# Patient Record
Sex: MALE | URBAN - METROPOLITAN AREA
[De-identification: names, ages, dates, MRNs, and addresses within clinical notes are randomized per-mention and may not be internally consistent; named-entity substitution may affect disease eponyms.]

---

## 2024-06-07 ENCOUNTER — EMERGENCY (EMERGENCY)
Facility: HOSPITAL | Age: 29
LOS: 1 days | Discharge: LEFT BEFORE DISCHARGE | End: 2024-06-07
Attending: EMERGENCY MEDICINE | Admitting: EMERGENCY MEDICINE

## 2024-06-07 VITALS
RESPIRATION RATE: 16 BRPM | OXYGEN SATURATION: 97 % | WEIGHT: 184.97 LBS | TEMPERATURE: 98 F | HEART RATE: 98 BPM | DIASTOLIC BLOOD PRESSURE: 75 MMHG | HEIGHT: 73 IN | SYSTOLIC BLOOD PRESSURE: 118 MMHG

## 2024-06-07 PROCEDURE — L9991: CPT

## 2024-06-08 NOTE — ED ADULT NURSE NOTE - BIRTH SEX
Refill completed. Thank you.   TORI Acevedo.    
Was the patient seen in the last year in this department? Yes     Does patient have an active prescription for medications requested? No     Received Request Via: Pharmacy  
Male

## 2024-06-08 NOTE — ED ADULT NURSE NOTE - NSFALLUNIVINTERV_ED_ALL_ED
"· Attain adequate rest and increase clear fluid intake.   · Practice regular hand hygiene and cover your cough to help prevent spread of infection  · Use Mucinex 600 mg twice daily and \"ocean\" or \"simply saline\" brand sterile nasal spray twice daily.   · Use warm compresses over sinuses for comfort as needed  · Alternate taking Tylenol 500 mg and Ibuprofen 400 mg every 4 hours as needed for headache, body aches, throat pain, and/or fever reduction  · Use Flonase 2 sprays in each nostril daily as needed for nasal congestion  · Please keep me informed of any acute changes in your status including new or worsening symptoms.   · Quarantine at home until your COVID test returns  · If you have worsening breathing, please to to the ER or urgent care to be evaluated  "
Bed/Stretcher in lowest position, wheels locked, appropriate side rails in place/Call bell, personal items and telephone in reach/Instruct patient to call for assistance before getting out of bed/chair/stretcher/Non-slip footwear applied when patient is off stretcher/[place] to call system/Physically safe environment - no spills, clutter or unnecessary equipment/Purposeful proactive rounding/Room/bathroom lighting operational, light cord in reach

## 2024-06-10 DIAGNOSIS — R09.A2 FOREIGN BODY SENSATION, THROAT: ICD-10-CM

## 2024-06-10 DIAGNOSIS — Z53.21 PROCEDURE AND TREATMENT NOT CARRIED OUT DUE TO PATIENT LEAVING PRIOR TO BEING SEEN BY HEALTH CARE PROVIDER: ICD-10-CM
